# Patient Record
Sex: MALE | Race: WHITE
[De-identification: names, ages, dates, MRNs, and addresses within clinical notes are randomized per-mention and may not be internally consistent; named-entity substitution may affect disease eponyms.]

---

## 2018-01-20 ENCOUNTER — HOSPITAL ENCOUNTER (EMERGENCY)
Dept: HOSPITAL 80 - FED | Age: 53
Discharge: HOME | End: 2018-01-20
Payer: MEDICAID

## 2018-01-20 VITALS — SYSTOLIC BLOOD PRESSURE: 133 MMHG | HEART RATE: 56 BPM | DIASTOLIC BLOOD PRESSURE: 85 MMHG | OXYGEN SATURATION: 97 %

## 2018-01-20 VITALS — RESPIRATION RATE: 16 BRPM | TEMPERATURE: 97.5 F

## 2018-01-20 DIAGNOSIS — M54.2: Primary | ICD-10-CM

## 2018-01-20 DIAGNOSIS — F17.200: ICD-10-CM
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
## 2018-06-28 ENCOUNTER — HOSPITAL ENCOUNTER (EMERGENCY)
Dept: HOSPITAL 80 - FED | Age: 53
Discharge: HOME | End: 2018-06-28
Payer: MEDICAID

## 2018-06-28 VITALS — SYSTOLIC BLOOD PRESSURE: 128 MMHG | DIASTOLIC BLOOD PRESSURE: 82 MMHG

## 2018-06-28 DIAGNOSIS — G44.229: ICD-10-CM
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
## 2018-06-28 DIAGNOSIS — F17.200: ICD-10-CM
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
## 2018-06-28 DIAGNOSIS — S61.211A: Primary | ICD-10-CM

## 2018-06-28 PROCEDURE — 3E0T3BZ INTRODUCTION OF ANESTHETIC AGENT INTO PERIPHERAL NERVES AND PLEXI, PERCUTANEOUS APPROACH: ICD-10-PCS

## 2018-06-29 NOTE — ASMTCMCOM
CM Note

 

CM Note                       

Notes:

Pt assistance requested by ED PAC David Hollins.  Pr requires outpatient follow up care.  This SW 

called the pt at the number listed on file, [phone]. A general voice mail answered the call 

and a message was left with ED CM information to assist with outpatient follow up/care.

 

Date Signed:  06/29/2018 09:59 AM

Electronically Signed By:Tali Lion LCSW